# Patient Record
Sex: MALE | Race: WHITE | Employment: FULL TIME | URBAN - METROPOLITAN AREA
[De-identification: names, ages, dates, MRNs, and addresses within clinical notes are randomized per-mention and may not be internally consistent; named-entity substitution may affect disease eponyms.]

---

## 2017-06-09 ENCOUNTER — ALLSCRIPTS OFFICE VISIT (OUTPATIENT)
Dept: OTHER | Facility: OTHER | Age: 47
End: 2017-06-09

## 2017-06-09 DIAGNOSIS — Z00.00 ENCOUNTER FOR GENERAL ADULT MEDICAL EXAMINATION WITHOUT ABNORMAL FINDINGS: ICD-10-CM

## 2017-06-09 DIAGNOSIS — M79.89 OTHER DISORDERS OF SOFT TISSUE: ICD-10-CM

## 2017-06-09 DIAGNOSIS — M10.9 GOUT: ICD-10-CM

## 2017-06-09 DIAGNOSIS — M19.90 OSTEOARTHRITIS: ICD-10-CM

## 2017-06-09 DIAGNOSIS — R34 ANURIA AND OLIGURIA: ICD-10-CM

## 2017-06-09 DIAGNOSIS — R63.4 ABNORMAL WEIGHT LOSS: ICD-10-CM

## 2017-06-09 DIAGNOSIS — M77.01 MEDIAL EPICONDYLITIS OF RIGHT ELBOW: ICD-10-CM

## 2017-06-09 DIAGNOSIS — M17.11 PRIMARY OSTEOARTHRITIS OF RIGHT KNEE: ICD-10-CM

## 2017-06-14 ENCOUNTER — LAB CONVERSION - ENCOUNTER (OUTPATIENT)
Dept: OTHER | Facility: OTHER | Age: 47
End: 2017-06-14

## 2017-06-14 LAB
A/G RATIO (HISTORICAL): 1.8 (CALC) (ref 1–2.5)
ALBUMIN SERPL BCP-MCNC: 4.4 G/DL (ref 3.6–5.1)
ALP SERPL-CCNC: 70 U/L (ref 40–115)
ALT SERPL W P-5'-P-CCNC: 22 U/L (ref 9–46)
ANTI-NUCLEAR ANTIBODY (ANA) (HISTORICAL): NEGATIVE
AST SERPL W P-5'-P-CCNC: 26 U/L (ref 10–40)
BACTERIA UR QL AUTO: NORMAL /HPF
BASOPHILS # BLD AUTO: 0.9 %
BASOPHILS # BLD AUTO: 46 CELLS/UL (ref 0–200)
BILIRUB SERPL-MCNC: 0.8 MG/DL (ref 0.2–1.2)
BILIRUB UR QL STRIP: NEGATIVE
BUN SERPL-MCNC: 11 MG/DL (ref 7–25)
BUN/CREA RATIO (HISTORICAL): ABNORMAL (CALC) (ref 6–22)
CALCIUM SERPL-MCNC: 9.5 MG/DL (ref 8.6–10.3)
CHLORIDE SERPL-SCNC: 104 MMOL/L (ref 98–110)
CHOLEST SERPL-MCNC: 150 MG/DL (ref 125–200)
CHOLEST/HDLC SERPL: 3.1 (CALC)
CK SERPL-CCNC: 329 U/L (ref 44–196)
CO2 SERPL-SCNC: 31 MMOL/L (ref 20–31)
COLOR UR: YELLOW
COMMENT (HISTORICAL): CLEAR
CREAT SERPL-MCNC: 1.02 MG/DL (ref 0.6–1.35)
CULTURE RESULT (HISTORICAL): NORMAL
DEPRECATED RDW RBC AUTO: 12.7 % (ref 11–15)
EGFR AFRICAN AMERICAN (HISTORICAL): 100 ML/MIN/1.73M2
EGFR-AMERICAN CALC (HISTORICAL): 87 ML/MIN/1.73M2
EOSINOPHIL # BLD AUTO: 1.4 %
EOSINOPHIL # BLD AUTO: 71 CELLS/UL (ref 15–500)
ERYTHROCYTE SEDIMENTATION RATE (HISTORICAL): 2 MM/H
FECAL OCCULT BLOOD DIAGNOSTIC (HISTORICAL): NEGATIVE
GAMMA GLOBULIN (HISTORICAL): 2.4 G/DL (CALC) (ref 1.9–3.7)
GLUCOSE (HISTORICAL): 104 MG/DL (ref 65–99)
GLUCOSE (HISTORICAL): NEGATIVE
HCT VFR BLD AUTO: 41.9 % (ref 38.5–50)
HDLC SERPL-MCNC: 48 MG/DL
HGB BLD-MCNC: 14.2 G/DL (ref 13.2–17.1)
HYALINE CASTS #/AREA URNS LPF: NORMAL /LPF
KETONES UR STRIP-MCNC: NEGATIVE MG/DL
LDL CHOLESTEROL (HISTORICAL): 86 MG/DL (CALC)
LEUKOCYTE ESTERASE UR QL STRIP: NEGATIVE
LYME IGG/IGM AB (HISTORICAL): <0.9 INDEX
LYMPHOCYTES # BLD AUTO: 1193 CELLS/UL (ref 850–3900)
LYMPHOCYTES # BLD AUTO: 23.4 %
MCH RBC QN AUTO: 29.6 PG (ref 27–33)
MCHC RBC AUTO-ENTMCNC: 33.8 G/DL (ref 32–36)
MCV RBC AUTO: 87.7 FL (ref 80–100)
MONOCYTES # BLD AUTO: 403 CELLS/UL (ref 200–950)
MONOCYTES (HISTORICAL): 7.9 %
NEUTROPHILS # BLD AUTO: 3386 CELLS/UL (ref 1500–7800)
NEUTROPHILS # BLD AUTO: 66.4 %
NITRITE UR QL STRIP: NEGATIVE
NON-HDL-CHOL (CHOL-HDL) (HISTORICAL): 102 MG/DL (CALC)
PH UR STRIP.AUTO: 7.5 [PH] (ref 5–8)
PLATELET # BLD AUTO: 236 THOUSAND/UL (ref 140–400)
PMV BLD AUTO: 8.4 FL (ref 7.5–12.5)
POTASSIUM SERPL-SCNC: 4.6 MMOL/L (ref 3.5–5.3)
PROSTATE SPECIFIC ANTIGEN TOTAL (HISTORICAL): 1.2 NG/ML
PROT UR STRIP-MCNC: NEGATIVE MG/DL
RBC # BLD AUTO: 4.78 MILLION/UL (ref 4.2–5.8)
RBC (HISTORICAL): NORMAL /HPF
RHEUMATOID FACTOR (HISTORICAL): 9 IU/ML
SODIUM SERPL-SCNC: 140 MMOL/L (ref 135–146)
SP GR UR STRIP.AUTO: 1.01 (ref 1–1.03)
SQUAMOUS EPITHELIAL CELLS (HISTORICAL): NORMAL /HPF
TOTAL PROTEIN (HISTORICAL): 6.8 G/DL (ref 6.1–8.1)
TRIGL SERPL-MCNC: 82 MG/DL
TSH SERPL DL<=0.05 MIU/L-ACNC: 1.03 MIU/L (ref 0.4–4.5)
URIC ACID (HISTORICAL): 6.2 MG/DL (ref 4–8)
WBC # BLD AUTO: 5.1 THOUSAND/UL (ref 3.8–10.8)
WBC # BLD AUTO: NORMAL /HPF

## 2017-06-16 ENCOUNTER — GENERIC CONVERSION - ENCOUNTER (OUTPATIENT)
Dept: OTHER | Facility: OTHER | Age: 47
End: 2017-06-16

## 2018-01-13 NOTE — RESULT NOTES
Verified Results  (Q) LIPID PANEL WITH REFLEX TO DIRECT LDL [id number] 09:46AM Nate Sarna     Test Name Result Flag Reference   CHOLESTEROL, TOTAL 150 mg/dL  125-200   HDL CHOLESTEROL 48 mg/dL  > OR = 40   TRIGLICERIDES 82 mg/dL  <980   LDL-CHOLESTEROL 86 mg/dL (calc)  <130   Desirable range <100 mg/dL for patients with CHD or  diabetes and <70 mg/dL for diabetic patients with  known heart disease  CHOL/HDLC RATIO 3 1 (calc)  < OR = 5 0   NON HDL CHOLESTEROL 102 mg/dL (calc)     Target for non-HDL cholesterol is 30 mg/dL higher than   LDL cholesterol target  (1) URIC ACID [id number] 09:46AM Nate Sarna     Test Name Result Flag Reference   URIC ACID 6 2 mg/dL  4 0-8 0   Therapeutic target for gout patients: <6 0 mg/dL     (1) COMPREHENSIVE METABOLIC PANEL [id number] 55:80NI Nate Sarna     Test Name Result Flag Reference   GLUCOSE 104 mg/dL H 65-99   Fasting reference interval     For someone without known diabetes, a glucose value  between 100 and 125 mg/dL is consistent with  prediabetes and should be confirmed with a  follow-up test    UREA NITROGEN (BUN) 11 mg/dL  7-25   CREATININE 1 02 mg/dL  0 60-1 35   eGFR NON-AFR   AMERICAN 87 mL/min/1 73m2  > OR = 60   eGFR AFRICAN AMERICAN 100 mL/min/1 73m2  > OR = 60   BUN/CREATININE RATIO   8-80   NOT APPLICABLE (calc)   SODIUM 140 mmol/L  135-146   POTASSIUM 4 6 mmol/L  3 5-5 3   CHLORIDE 104 mmol/L     CARBON DIOXIDE 31 mmol/L  20-31   CALCIUM 9 5 mg/dL  8 6-10 3   PROTEIN, TOTAL 6 8 g/dL  6 1-8 1   ALBUMIN 4 4 g/dL  3 6-5 1   GLOBULIN 2 4 g/dL (calc)  1 9-3 7   ALBUMIN/GLOBULIN RATIO 1 8 (calc)  1 0-2 5   BILIRUBIN, TOTAL 0 8 mg/dL  0 2-1 2   ALKALINE PHOSPHATASE 70 U/L     AST 26 U/L  10-40   ALT 22 U/L  9-46     (1) CK (CPK) [id number] 09:46AM Nate Sarna     Test Name Result Flag Reference   CREATINE KINASE, TOTAL 329 U/L H      (Q) SED RATE BY MODIFIED Nilam Ashley [id number] 09:46AM Nate Serrano     Test Name Result Flag Reference   SED RATE BY MODIFIED$[facility] 2 mm/h  < OR = 15     (1) URINALYSIS w URINE C/S REFLEX (will reflex a microscopy if leukocytes, occult blood, or nitrites are not within normal limits) [id number] 09:46AM Ashland Ing     Test Name Result Flag Reference   SPECIFIC GRAVITY 1 012  1 001-1 035   BILIRUBIN NEGATIVE  NEGATIVE   GLUCOSE NEGATIVE  NEGATIVE   COLOR YELLOW  YELLOW   APPEARANCE CLEAR  CLEAR   PH 7 5  5 0-8 0   KETONES NEGATIVE  NEGATIVE   OCCULT BLOOD NEGATIVE  NEGATIVE   PROTEIN NEGATIVE  NEGATIVE   SQUAMOUS EPITHELIAL CELLS NONE SEEN /HPF  < OR = 5   HYALINE CAST NONE SEEN /LPF  NONE SEEN   REFLEXIVE URINE CULTURE NO CULTURE INDICATED     RBC NONE SEEN /HPF  < OR = 2   NITRITE NEGATIVE  NEGATIVE   LEUKOCYTE ESTERASE NEGATIVE  NEGATIVE   WBC NONE SEEN /HPF  < OR = 5   BACTERIA NONE SEEN /HPF  NONE SEEN     (1) CBC/PLT/DIFF [id number] 09:46AM Ashland Ing     Test Name Result Flag Reference   WHITE BLOOD CELL COUNT 5 1 Thousand/uL  3 8-10 8   RED BLOOD CELL COUNT 4 78 Million/uL  4 20-5 80   HEMOGLOBIN 14 2 g/dL  13 2-17 1   HEMATOCRIT 41 9 %  38 5-50 0   MCV 87 7 fL  80 0-100 0   MCH 29 6 pg  27 0-33 0   MCHC 33 8 g/dL  32 0-36 0   RDW 12 7 %  11 0-15 0   PLATELET COUNT 659 Thousand/uL  140-400   ABSOLUTE NEUTROPHILS 3386 cells/uL  2585-0991   ABSOLUTE LYMPHOCYTES 1193 cells/uL  850-3900   ABSOLUTE MONOCYTES 403 cells/uL  200-950   ABSOLUTE EOSINOPHILS 71 cells/uL     ABSOLUTE BASOPHILS 46 cells/uL  0-200   NEUTROPHILS 66 4 %     LYMPHOCYTES 23 4 %     MONOCYTES 7 9 %     EOSINOPHILS 1 4 %     BASOPHILS 0 9 %     MPV 8 4 fL  7 5-12 5     (Q) LYME DISEASE AB, TOTAL W/REFL WB (IGG, IGM) [id number] 09:46AM Ashland Ing     Test Name Result Flag Reference   LYME AB SCREEN <0 90 index     Index                Interpretation                     -----                --------------                     < 0 90               Negative                     0  90-1 09            Equivocal > 1 09               Positive      As recommended by the Food and Drug Administration   (FDA), all samples with positive or equivocal   results in a Borrelia burgdorferi antibody screen  will be tested using a blot method  Positive or   equivocal screening test results should not be   interpreted as truly positive until verified as such   using a supplemental assay (e g , B  burgdorferi blot)  The screening test and/or blot for B  burgdorferi   antibodies may be falsely negative in early stages  of Lyme disease, including the period when erythema   migrans is apparent  (Q) DAPHNE SCREEN, IFA, WITH REFLEX TO TITER AND PATTERN [id number] 09:46AM Genetta Guard     Test Name Result Flag Reference   DAPHNE SCREEN, IFA NEGATIVE  NEGATIVE   DAPHNE IFA is a first line screen for detecting the  presence of up to approximately 150 autoantibodies in  various autoimmune diseases  A negative DAPHNE IFA result  suggests DAPHNE-associated autoimmune diseases are not  present at this time  Visit Physician FAQs for interpretation of all  antibodies in the [place], prevalence, and association  with diseases at http://Talkito/  faq/GJW949     (1) RF QUANTITATION [id number] 09:46AM Genetta Guard     Test Name Result Flag Reference   RHEUMATOID FACTOR 9 IU/mL  <14     (Q) TSH, 3RD GENERATION [id number] 09:46AM Estechta Guard     Test Name Result Flag Reference   TSH 1 03 mIU/L  0 40-4 50     (1) PSA (SCREEN) (Dx V76 44 Screen for Prostate Cancer) [id number] 09:46AM Unype   REPORT COMMENT:  FASTING:YES     Test Name Result Flag Reference   PSA, TOTAL 1 2 ng/mL  < OR = 4 0   The total PSA value from this assay system is   standardized against the WHO standard  The test   result will be approximately 20% lower when compared   to the equimolar-standardized total PSA (Tata   Dalton)  Comparison of serial PSA results should be   interpreted with this fact in mind       This test was performed using the Siemens   chemiluminescent method  Values obtained from   different assay methods cannot be used  interchangeably  PSA levels, regardless of  value, should not be interpreted as absolute  evidence of the presence or absence of disease

## 2018-01-14 VITALS
SYSTOLIC BLOOD PRESSURE: 124 MMHG | RESPIRATION RATE: 16 BRPM | HEART RATE: 56 BPM | WEIGHT: 220.5 LBS | HEIGHT: 73 IN | DIASTOLIC BLOOD PRESSURE: 80 MMHG | BODY MASS INDEX: 29.22 KG/M2

## 2018-07-10 ENCOUNTER — APPOINTMENT (EMERGENCY)
Dept: CT IMAGING | Facility: HOSPITAL | Age: 48
DRG: 683 | End: 2018-07-10
Payer: COMMERCIAL

## 2018-07-10 ENCOUNTER — HOSPITAL ENCOUNTER (INPATIENT)
Facility: HOSPITAL | Age: 48
LOS: 2 days | Discharge: HOME | DRG: 683 | End: 2018-07-12
Attending: EMERGENCY MEDICINE | Admitting: FAMILY MEDICINE
Payer: COMMERCIAL

## 2018-07-10 DIAGNOSIS — N17.9 AKI (ACUTE KIDNEY INJURY) (HCC): ICD-10-CM

## 2018-07-10 DIAGNOSIS — E87.5 HYPERKALEMIA: Primary | ICD-10-CM

## 2018-07-10 LAB
ALBUMIN SERPL BCP-MCNC: 5.9 G/DL (ref 3.5–5)
ALP SERPL-CCNC: 95 U/L (ref 46–116)
ALT SERPL W P-5'-P-CCNC: 52 U/L (ref 12–78)
ANION GAP SERPL CALCULATED.3IONS-SCNC: 12 MMOL/L (ref 4–13)
ANION GAP SERPL CALCULATED.3IONS-SCNC: 16 MMOL/L (ref 4–13)
AST SERPL W P-5'-P-CCNC: 74 U/L (ref 5–45)
BACTERIA UR QL AUTO: NORMAL /HPF
BASOPHILS # BLD AUTO: 0.07 THOUSANDS/ΜL (ref 0–0.1)
BASOPHILS NFR BLD AUTO: 1 % (ref 0–1)
BILIRUB SERPL-MCNC: 2.3 MG/DL (ref 0.2–1)
BILIRUB UR QL STRIP: NEGATIVE
BUN SERPL-MCNC: 30 MG/DL (ref 5–25)
BUN SERPL-MCNC: 31 MG/DL (ref 5–25)
CALCIUM SERPL-MCNC: 10.7 MG/DL (ref 8.3–10.1)
CALCIUM SERPL-MCNC: 8.3 MG/DL (ref 8.3–10.1)
CHLORIDE SERPL-SCNC: 104 MMOL/L (ref 100–108)
CHLORIDE SERPL-SCNC: 93 MMOL/L (ref 100–108)
CK MB SERPL-MCNC: 16 NG/ML (ref 0–5)
CK MB SERPL-MCNC: <1 % (ref 0–2.5)
CK SERPL-CCNC: 2338 U/L (ref 39–308)
CLARITY UR: CLEAR
CO2 SERPL-SCNC: 22 MMOL/L (ref 21–32)
CO2 SERPL-SCNC: 24 MMOL/L (ref 21–32)
COLOR UR: YELLOW
CREAT SERPL-MCNC: 2.17 MG/DL (ref 0.6–1.3)
CREAT SERPL-MCNC: 2.93 MG/DL (ref 0.6–1.3)
EOSINOPHIL # BLD AUTO: 0.01 THOUSAND/ΜL (ref 0–0.61)
EOSINOPHIL NFR BLD AUTO: 0 % (ref 0–6)
ERYTHROCYTE [DISTWIDTH] IN BLOOD BY AUTOMATED COUNT: 11.8 % (ref 11.6–15.1)
GFR SERPL CREATININE-BSD FRML MDRD: 24 ML/MIN/1.73SQ M
GFR SERPL CREATININE-BSD FRML MDRD: 34 ML/MIN/1.73SQ M
GLUCOSE SERPL-MCNC: 50 MG/DL (ref 65–140)
GLUCOSE SERPL-MCNC: 97 MG/DL (ref 65–140)
GLUCOSE UR STRIP-MCNC: NEGATIVE MG/DL
HCT VFR BLD AUTO: 48.7 % (ref 36.5–49.3)
HGB BLD-MCNC: 17.3 G/DL (ref 12–17)
HGB UR QL STRIP.AUTO: ABNORMAL
IMM GRANULOCYTES # BLD AUTO: 0.06 THOUSAND/UL (ref 0–0.2)
IMM GRANULOCYTES NFR BLD AUTO: 1 % (ref 0–2)
KETONES UR STRIP-MCNC: NEGATIVE MG/DL
LEUKOCYTE ESTERASE UR QL STRIP: NEGATIVE
LYMPHOCYTES # BLD AUTO: 1.56 THOUSANDS/ΜL (ref 0.6–4.47)
LYMPHOCYTES NFR BLD AUTO: 12 % (ref 14–44)
MAGNESIUM SERPL-MCNC: 2.4 MG/DL (ref 1.6–2.6)
MCH RBC QN AUTO: 29.9 PG (ref 26.8–34.3)
MCHC RBC AUTO-ENTMCNC: 35.5 G/DL (ref 31.4–37.4)
MCV RBC AUTO: 84 FL (ref 82–98)
MONOCYTES # BLD AUTO: 1.31 THOUSAND/ΜL (ref 0.17–1.22)
MONOCYTES NFR BLD AUTO: 10 % (ref 4–12)
NEUTROPHILS # BLD AUTO: 10.23 THOUSANDS/ΜL (ref 1.85–7.62)
NEUTS SEG NFR BLD AUTO: 76 % (ref 43–75)
NITRITE UR QL STRIP: NEGATIVE
NON-SQ EPI CELLS URNS QL MICRO: NORMAL /HPF
NRBC BLD AUTO-RTO: 0 /100 WBCS
PH UR STRIP.AUTO: 6 [PH] (ref 4.5–8)
PLATELET # BLD AUTO: 392 THOUSANDS/UL (ref 149–390)
PMV BLD AUTO: 9.1 FL (ref 8.9–12.7)
POTASSIUM SERPL-SCNC: 4.1 MMOL/L (ref 3.5–5.3)
POTASSIUM SERPL-SCNC: 6.9 MMOL/L (ref 3.5–5.3)
PROT SERPL-MCNC: 9.7 G/DL (ref 6.4–8.2)
PROT UR STRIP-MCNC: NEGATIVE MG/DL
RBC # BLD AUTO: 5.78 MILLION/UL (ref 3.88–5.62)
RBC #/AREA URNS AUTO: NORMAL /HPF
SODIUM SERPL-SCNC: 133 MMOL/L (ref 136–145)
SODIUM SERPL-SCNC: 138 MMOL/L (ref 136–145)
SP GR UR STRIP.AUTO: 1.01 (ref 1–1.03)
TROPONIN I SERPL-MCNC: <0.02 NG/ML
UROBILINOGEN UR QL STRIP.AUTO: 0.2 E.U./DL
WBC # BLD AUTO: 13.24 THOUSAND/UL (ref 4.31–10.16)
WBC #/AREA URNS AUTO: NORMAL /HPF

## 2018-07-10 PROCEDURE — 81001 URINALYSIS AUTO W/SCOPE: CPT | Performed by: FAMILY MEDICINE

## 2018-07-10 PROCEDURE — 80053 COMPREHEN METABOLIC PANEL: CPT | Performed by: EMERGENCY MEDICINE

## 2018-07-10 PROCEDURE — 93005 ELECTROCARDIOGRAM TRACING: CPT

## 2018-07-10 PROCEDURE — 80048 BASIC METABOLIC PNL TOTAL CA: CPT | Performed by: EMERGENCY MEDICINE

## 2018-07-10 PROCEDURE — 70450 CT HEAD/BRAIN W/O DYE: CPT

## 2018-07-10 PROCEDURE — 83735 ASSAY OF MAGNESIUM: CPT | Performed by: EMERGENCY MEDICINE

## 2018-07-10 PROCEDURE — 96375 TX/PRO/DX INJ NEW DRUG ADDON: CPT

## 2018-07-10 PROCEDURE — 82550 ASSAY OF CK (CPK): CPT | Performed by: EMERGENCY MEDICINE

## 2018-07-10 PROCEDURE — 84484 ASSAY OF TROPONIN QUANT: CPT | Performed by: EMERGENCY MEDICINE

## 2018-07-10 PROCEDURE — 96365 THER/PROPH/DIAG IV INF INIT: CPT

## 2018-07-10 PROCEDURE — 99223 1ST HOSP IP/OBS HIGH 75: CPT | Performed by: FAMILY MEDICINE

## 2018-07-10 PROCEDURE — 82553 CREATINE MB FRACTION: CPT | Performed by: EMERGENCY MEDICINE

## 2018-07-10 PROCEDURE — 96374 THER/PROPH/DIAG INJ IV PUSH: CPT

## 2018-07-10 PROCEDURE — 36415 COLL VENOUS BLD VENIPUNCTURE: CPT | Performed by: EMERGENCY MEDICINE

## 2018-07-10 PROCEDURE — 99285 EMERGENCY DEPT VISIT HI MDM: CPT

## 2018-07-10 PROCEDURE — 85025 COMPLETE CBC W/AUTO DIFF WBC: CPT | Performed by: EMERGENCY MEDICINE

## 2018-07-10 RX ORDER — DIAZEPAM 5 MG/ML
5 INJECTION, SOLUTION INTRAMUSCULAR; INTRAVENOUS ONCE
Status: COMPLETED | OUTPATIENT
Start: 2018-07-10 | End: 2018-07-10

## 2018-07-10 RX ORDER — SODIUM CHLORIDE 9 MG/ML
125 INJECTION, SOLUTION INTRAVENOUS CONTINUOUS
Status: DISCONTINUED | OUTPATIENT
Start: 2018-07-10 | End: 2018-07-11

## 2018-07-10 RX ORDER — HEPARIN SODIUM 5000 [USP'U]/ML
5000 INJECTION, SOLUTION INTRAVENOUS; SUBCUTANEOUS EVERY 8 HOURS SCHEDULED
Status: DISCONTINUED | OUTPATIENT
Start: 2018-07-10 | End: 2018-07-12 | Stop reason: HOSPADM

## 2018-07-10 RX ORDER — ACETAMINOPHEN 325 MG/1
650 TABLET ORAL EVERY 6 HOURS PRN
Status: DISCONTINUED | OUTPATIENT
Start: 2018-07-10 | End: 2018-07-11

## 2018-07-10 RX ORDER — SODIUM POLYSTYRENE SULFONATE 15 G/60ML
30 SUSPENSION ORAL; RECTAL ONCE
Status: COMPLETED | OUTPATIENT
Start: 2018-07-10 | End: 2018-07-10

## 2018-07-10 RX ORDER — NICOTINE 21 MG/24HR
1 PATCH, TRANSDERMAL 24 HOURS TRANSDERMAL DAILY
Status: DISCONTINUED | OUTPATIENT
Start: 2018-07-11 | End: 2018-07-12 | Stop reason: HOSPADM

## 2018-07-10 RX ORDER — DEXTROSE MONOHYDRATE 25 G/50ML
25 INJECTION, SOLUTION INTRAVENOUS ONCE
Status: COMPLETED | OUTPATIENT
Start: 2018-07-10 | End: 2018-07-10

## 2018-07-10 RX ADMIN — DEXTROSE MONOHYDRATE 25 G: 25 INJECTION, SOLUTION INTRAVENOUS at 15:40

## 2018-07-10 RX ADMIN — SODIUM CHLORIDE 2000 ML: 0.9 INJECTION, SOLUTION INTRAVENOUS at 15:53

## 2018-07-10 RX ADMIN — Medication 1 G: at 15:53

## 2018-07-10 RX ADMIN — Medication 5 MG: at 15:15

## 2018-07-10 RX ADMIN — INSULIN HUMAN 10 UNITS: 100 INJECTION, SOLUTION PARENTERAL at 15:41

## 2018-07-10 RX ADMIN — HEPARIN SODIUM 5000 UNITS: 5000 INJECTION, SOLUTION INTRAVENOUS; SUBCUTANEOUS at 21:36

## 2018-07-10 RX ADMIN — SODIUM CHLORIDE 2000 ML: 0.9 INJECTION, SOLUTION INTRAVENOUS at 16:49

## 2018-07-10 RX ADMIN — SODIUM POLYSTYRENE SULFONATE 30 G: 15 SUSPENSION ORAL; RECTAL at 15:48

## 2018-07-10 NOTE — H&P
H&P- Tara Heard 1970, 46 y o  male MRN: [id number]    Unit/Bed#: ED 28 Encounter: [id number]    Primary Care Provider: Percy Escobar MD   Date and time admitted to hospital: 7/10/2018  2:38 PM        Acute renal failure (ARF) [facility])   Assessment & Plan    Secondary to dehydration  Encourage hydration  If the patient does not improve tomorrow will do some further imaging such as a renal ultrasound and have Nephrology evaluate the patient  I do not feel it is necessary at this time  Will check a urinalysis if not sent  Patient also has evidence of rhabdomyolysis  Check CPK level in the morning  Acute hyperkalemia   Assessment & Plan    Hyperkalemia: This is likely secondary to severe dehydration  The patient did have some EKG changes and was treated here in the emergency department  Repeat potassium level is 4 1  Continue to monitor on telemetry and check another level in the morning  VTE Prophylaxis: Heparin  / sequential compression device   Code Status:  Full code  POLST: POLST is not applicable to this patient  Discussion with family:  Wife at the bedside    Anticipated Length of Stay:  Patient will be admitted on an Inpatient basis with an anticipated length of stay of  greater than 2 midnights  Justification for Hospital Stay:  Greater than 2 midnights secondary to having acute renal failure and severe hypokalemia with EKG changes needing aggressive hydration    Total Time for Visit, including Counseling / Coordination of Care: 1 hour  Greater than 50% of this total time spent on direct patient counseling and coordination of care  Chief Complaint:   Generalized pain the and weakness    History of Present Illness:    Tara Heard is a 46 y o  male who presents with generalized cramping and weakness  Patient states since yesterday he has not been feeling well  He is a  and is outside a lot    He states that he does drink several bottles of water a day but yesterday he just started cramping  He just felt like he was having a heart attack as well but no such chest pain but just generalized aches and pains  He felt very nauseous and dizzy as well  The patient states he gets this way every now and then in in the past he has been told that he gets severely dehydrated  He is not on any current medications and has had no change to his diet  He is feeling significantly better since he has been here in the emergency department       Review of Systems:    Review of Systems   Constitutional: Positive for appetite change and fatigue  Respiratory: Positive for chest tightness  Musculoskeletal:        Severe muscle cramps   Neurological: Positive for dizziness and light-headedness  All other systems reviewed and are negative  Past Medical and Surgical History:     Past Medical History:   Diagnosis Date    Asthma     Gout        Past Surgical History:   Procedure Laterality Date    FINGER AMPUTATION         Meds/Allergies:    Prior to Admission medications    Not on File     I have reviewed home medications with patient personally      Allergies: No Known Allergies    Social History:     Marital Status: /Civil Union   Occupation:  Construction  Patient Pre-hospital Living Situation:  Lives with his wife  Patient Pre-hospital Level of Mobility:  Independent  Patient Pre-hospital Diet Restrictions:  None  Substance Use History:   History   Alcohol Use    Yes     Comment: on rare occas     History   Smoking Status    Current Every Day Smoker    Packs/day: 1 00    Types: Cigarettes   Smokeless Tobacco    Never Used     History   Drug Use No       Family History:    Family History   Problem Relation Age of Onset    No Known Problems Mother     No Known Problems Father        Physical Exam:     Vitals:   Blood Pressure: 158/77 (07/10/18 1601)  Pulse: 75 (07/10/18 1601)  Temperature: 98 3 °F (36 8 °C) (07/10/18 1445)  Temp Source: Oral (07/10/18 1445)  Respirations: 18 (07/10/18 1601)  Weight - Scale: 115 kg (253 lb 8 5 oz) (07/10/18 1446)  SpO2: 100 % (07/10/18 1601)    Physical Exam    (   General Appearance:    Alert, cooperative, no distress, appears stated age   Head:    Normocephalic, without obvious abnormality, atraumatic   Eyes:    PERRL, conjunctiva/corneas clear, EOM's intact,             Nose:   Nares normal, septum midline, mucosa normal   Throat:   Lips, mucosa, and tongue normal; teeth and gums normal   Neck:   Supple, symmetrical, no adenopathy;        thyroid:  No enlargement/tenderness/nodules; no carotid    bruit or JVD   Back:     Symmetric, no curvature, ROM normal, no CVA tenderness   Lungs:     Clear to auscultation bilaterally, respirations unlabored       Heart:    Regular rate and rhythm, S1 and S2 normal, no murmur, rub    or gallop   Abdomen:     Soft, non-tender, bowel sounds active all four quadrants,     no masses, no organomegaly           Extremities:   Extremities normal, atraumatic, no cyanosis or edema   Pulses:   2+ and symmetric all extremities   Skin:   Skin color, texture, turgor normal, no rashes or lesions   Lymph nodes:   Cervical, supraclavicular, and axillary nodes normal   Neurologic:   CNII-XII intact  Normal strength, sensation and reflexes       throughout   exam)    Additional Data:     Lab Results: I have personally reviewed pertinent reports          Results from last 7 days  Lab Units 07/10/18  1457   WBC Thousand/uL 13 24*   HEMOGLOBIN g/dL 17 3*   HEMATOCRIT % 48 7   PLATELETS Thousands/uL 392*   NEUTROS PCT % 76*   LYMPHS PCT % 12*   MONOS PCT % 10   EOS PCT % 0       Results from last 7 days  Lab Units 07/10/18  1618 07/10/18  1456   SODIUM mmol/L 138 133*   POTASSIUM mmol/L 4 1 6 9*   CHLORIDE mmol/L 104 93*   CO2 mmol/L 22 24   BUN mg/dL 30* 31*   CREATININE mg/dL 2 17* 2 93*   CALCIUM mg/dL 8 3 10 7*   TOTAL PROTEIN g/dL  --  9 7*   BILIRUBIN TOTAL mg/dL  --  2 30*   ALK PHOS U/L  --  95   ALT U/L --  52   AST U/L  --  74*   GLUCOSE RANDOM mg/dL 50* 97                   Imaging: I have personally reviewed pertinent reports  CT head without contrast   Final Result by Lucy Jackson MD (07/10 1511)      No acute intracranial abnormality  Workstation performed: [id number]             ·     [facility] Review[facility] Yes     ** Please Note: This note has been constructed using a voice recognition system   **

## 2018-07-10 NOTE — LETTER
[address] FLO[address] MED SURG UNIT  [facility] [address]  Dept: [phone]    July 12, 2018     Patient: Tara Heard   YOB: 1970   Date of Visit: 7/10/2018       To Whom it May Concern:    Emeterio Fitzpatrick is under my professional care  He was seen in the hospital from 7/10/2018   to 07/12/18  He Should remain out of work until evaluated by his pcp on 7/19/2018    If you have any questions or concerns, please don't hesitate to call           Sincerely,          Briseida Cordova

## 2018-07-10 NOTE — ASSESSMENT & PLAN NOTE
Secondary to dehydration  Encourage hydration  If the patient does not improve tomorrow will do some further imaging such as a renal ultrasound and have Nephrology evaluate the patient  I do not feel it is necessary at this time    Will check a urinalysis if not sent

## 2018-07-10 NOTE — ASSESSMENT & PLAN NOTE
Hyperkalemia: This is likely secondary to severe dehydration  The patient did have some EKG changes and was treated here in the emergency department  Repeat potassium level is 4 1  Continue to monitor on telemetry and check another level in the morning

## 2018-07-10 NOTE — ED PROVIDER NOTES
History  Chief Complaint   Patient presents with    Heat Exposure     Patient stated that 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 and disoriented  Patient drove home however doesnt know how he got there  68-year-old male patient presents to the emergency department, on his birthday, for evaluation of dizziness  Patient states he was at work today, [employer], when he became too dizzy to stand  The patient got down from the roof and stated his dizziness could worsened  The patient had EKG done upon arrival here at 1445 hr, reviewed and interpreted by me at bedside showing a sinus rhythm with peaked T-waves in the ventricular leads  Ventricular rate is 69 with no other ectopy  The patient does have a positive modified Mc-Hallpike with looking to the right  The patient has no other physical exam abnormalities other than he does have dry mucous membranes  Pre-hospital IVs been established in the patient is getting some IV fluids, a larger IV will be established and fluids will be delivered more quickly  Labs will be done, CT scan of the head will be done, to evaluate the patient with a differential diagnosis to include but not be limited to dehydration, vertigo, heat exhaustion, metabolic abnormality from dehydration          History provided by:  Patient   used: No    Dizziness   Quality:  Lightheadedness, head spinning, imbalance and room spinning  Severity:  Moderate  Onset quality:  Gradual  Timing:  Constant  Progression:  Worsening  Chronicity:  New  Context: bending over, head movement, physical activity and standing up    Context: not with bowel movement and not with loss of consciousness    Relieved by:  Nothing  Worsened by:  Nothing  Ineffective treatments:  None tried  Associated symptoms: no blood in stool, no hearing loss, no shortness of breath, no vision changes and no vomiting    Risk factors: no anemia        None       Past Medical History:   Diagnosis Date    Asthma     Gout        Past Surgical History:   Procedure Laterality Date    FINGER AMPUTATION         No family history on file  I have reviewed and agree with the history as documented  Social History   Substance Use Topics    Smoking status: Current Every Day Smoker     Packs/day: 1 00     Types: Cigarettes    Smokeless tobacco: Never Used    Alcohol use Yes        Review of Systems   HENT: Negative for hearing loss  Respiratory: Negative for shortness of breath  Gastrointestinal: Negative for blood in stool and vomiting  Neurological: Positive for dizziness  All other systems reviewed and are negative  Physical Exam  Physical Exam   Constitutional: He is oriented to person, place, and time  He appears well-developed and well-nourished  No distress  HENT:   Head: Normocephalic and atraumatic  Right Ear: External ear normal    Left Ear: External ear normal    Eyes: Conjunctivae and EOM are normal  Right eye exhibits no discharge  Left eye exhibits no discharge  No scleral icterus  Neck: Normal range of motion  Neck supple  No JVD present  No tracheal deviation present  No thyromegaly present  Cardiovascular: Normal rate and regular rhythm  Pulmonary/Chest: Effort normal and breath sounds normal  No stridor  No respiratory distress  He has no wheezes  He has no rales  Abdominal: Soft  Bowel sounds are normal  He exhibits no distension  There is no tenderness  Musculoskeletal: Normal range of motion  He exhibits no edema, tenderness or deformity  Neurological: He is alert and oriented to person, place, and time  No cranial nerve deficit  Coordination normal    Skin: Skin is warm and dry  He is not diaphoretic  Psychiatric: He has a normal mood and affect  His behavior is normal    Nursing note and vitals reviewed        Vital Signs  ED Triage Vitals [07/10/18 1445]   Temp Pulse Respirations BP SpO2   -- 66 17 -- 98 %      Temp src Heart Rate Source Patient Position - Orthostatic VS BP Location FiO2 (%)   -- Monitor Lying Right arm --      Pain Score       --           Vitals:    07/10/18 1445   Pulse: 66   Patient Position - Orthostatic VS: Lying       Visual Acuity      ED Medications  Medications   diazepam (VALIUM) injection 5 mg (not administered)       Diagnostic Studies  Results Reviewed     Procedure Component Value Units Date/Time    CBC and differential [[id number]]     Lab Status:  No result Specimen:  Blood     Comprehensive metabolic panel [[id number]]     Lab Status:  No result Specimen:  Blood     Troponin I [[id number]]     Lab Status:  No result Specimen:  Blood     Magnesium [[id number]]     Lab Status:  No result Specimen:  Blood                  CT head without contrast    (Results Pending)              Procedures  Procedures       Phone Contacts  ED Phone Contact    ED Course  ED Course as of Jul 10 1603   Tue Jul 10, 2018   1544 The patient is stable  He is receiving treatment for hyperkalemia  He states this has happened in the past                                   MDM  Number of Diagnoses or Management Options  SAJAN (acute kidney injury) [facility]): new and requires workup  Hyperkalemia: new and requires workup     Amount and/or Complexity of Data Reviewed  Clinical lab tests: ordered and reviewed  Tests in the radiology section of CPT®: ordered and reviewed  Decide to obtain previous medical records or to obtain history from someone other than the patient: yes  Review and summarize past medical records: yes  Independent visualization of images, tracings, or specimens: yes    Patient Progress  Patient progress: stable    The patient presented with a condition in which there was a high probability of imminent or life-threatening deterioration, and critical care services (excluding separately billable procedures) totalled 30-74 minutes          Disposition  Final diagnoses:   None     ED Disposition     None      Follow-up Information    None         Patient's Medications    No medications on file     No discharge procedures on file      ED Provider  Electronically Signed by           Saray Liriano DO  07/10/18 1881

## 2018-07-11 ENCOUNTER — TELEPHONE (OUTPATIENT)
Dept: INTERNAL MEDICINE CLINIC | Facility: CLINIC | Age: 48
End: 2018-07-11

## 2018-07-11 PROBLEM — M62.82 RHABDOMYOLYSIS: Status: ACTIVE | Noted: 2018-07-11

## 2018-07-11 LAB
ANION GAP SERPL CALCULATED.3IONS-SCNC: 3 MMOL/L (ref 4–13)
ATRIAL RATE: 69 BPM
ATRIAL RATE: 80 BPM
ATRIAL RATE: 83 BPM
BUN SERPL-MCNC: 18 MG/DL (ref 5–25)
CALCIUM SERPL-MCNC: 7.7 MG/DL (ref 8.3–10.1)
CHLORIDE SERPL-SCNC: 108 MMOL/L (ref 100–108)
CK MB SERPL-MCNC: 5.5 NG/ML (ref 0–5)
CK MB SERPL-MCNC: 7.4 NG/ML (ref 0–5)
CK MB SERPL-MCNC: <1 % (ref 0–2.5)
CK MB SERPL-MCNC: <1 % (ref 0–2.5)
CK SERPL-CCNC: 1082 U/L (ref 39–308)
CK SERPL-CCNC: 875 U/L (ref 39–308)
CO2 SERPL-SCNC: 28 MMOL/L (ref 21–32)
CREAT SERPL-MCNC: 1.26 MG/DL (ref 0.6–1.3)
GFR SERPL CREATININE-BSD FRML MDRD: 66 ML/MIN/1.73SQ M
GLUCOSE SERPL-MCNC: 99 MG/DL (ref 65–140)
MAGNESIUM SERPL-MCNC: 2.5 MG/DL (ref 1.6–2.6)
P AXIS: 57 DEGREES
P AXIS: 63 DEGREES
P AXIS: 67 DEGREES
POTASSIUM SERPL-SCNC: 4.3 MMOL/L (ref 3.5–5.3)
PR INTERVAL: 178 MS
PR INTERVAL: 178 MS
PR INTERVAL: 182 MS
QRS AXIS: 26 DEGREES
QRS AXIS: 28 DEGREES
QRS AXIS: 48 DEGREES
QRSD INTERVAL: 86 MS
QRSD INTERVAL: 96 MS
QRSD INTERVAL: 98 MS
QT INTERVAL: 400 MS
QT INTERVAL: 410 MS
QT INTERVAL: 418 MS
QTC INTERVAL: 439 MS
QTC INTERVAL: 470 MS
QTC INTERVAL: 482 MS
SODIUM SERPL-SCNC: 139 MMOL/L (ref 136–145)
T WAVE AXIS: 43 DEGREES
T WAVE AXIS: 45 DEGREES
T WAVE AXIS: 53 DEGREES
VENTRICULAR RATE: 69 BPM
VENTRICULAR RATE: 80 BPM
VENTRICULAR RATE: 83 BPM

## 2018-07-11 PROCEDURE — 82550 ASSAY OF CK (CPK): CPT | Performed by: FAMILY MEDICINE

## 2018-07-11 PROCEDURE — 83735 ASSAY OF MAGNESIUM: CPT | Performed by: FAMILY MEDICINE

## 2018-07-11 PROCEDURE — 93010 ELECTROCARDIOGRAM REPORT: CPT | Performed by: INTERNAL MEDICINE

## 2018-07-11 PROCEDURE — 82550 ASSAY OF CK (CPK): CPT | Performed by: NURSE PRACTITIONER

## 2018-07-11 PROCEDURE — 99233 SBSQ HOSP IP/OBS HIGH 50: CPT | Performed by: NURSE PRACTITIONER

## 2018-07-11 PROCEDURE — 80048 BASIC METABOLIC PNL TOTAL CA: CPT | Performed by: FAMILY MEDICINE

## 2018-07-11 PROCEDURE — 82553 CREATINE MB FRACTION: CPT | Performed by: FAMILY MEDICINE

## 2018-07-11 PROCEDURE — 82553 CREATINE MB FRACTION: CPT | Performed by: NURSE PRACTITIONER

## 2018-07-11 RX ORDER — SODIUM CHLORIDE 9 MG/ML
125 INJECTION, SOLUTION INTRAVENOUS CONTINUOUS
Status: DISCONTINUED | OUTPATIENT
Start: 2018-07-11 | End: 2018-07-12 | Stop reason: HOSPADM

## 2018-07-11 RX ORDER — ACETAMINOPHEN 325 MG/1
650 TABLET ORAL EVERY 6 HOURS PRN
Status: DISCONTINUED | OUTPATIENT
Start: 2018-07-11 | End: 2018-07-12 | Stop reason: HOSPADM

## 2018-07-11 RX ADMIN — SODIUM CHLORIDE 125 ML/HR: 0.9 INJECTION, SOLUTION INTRAVENOUS at 02:04

## 2018-07-11 RX ADMIN — NICOTINE 1 PATCH: 21 PATCH, EXTENDED RELEASE TRANSDERMAL at 08:12

## 2018-07-11 RX ADMIN — SODIUM CHLORIDE 1000 ML: 0.9 INJECTION, SOLUTION INTRAVENOUS at 11:52

## 2018-07-11 RX ADMIN — SODIUM CHLORIDE 125 ML/HR: 0.9 INJECTION, SOLUTION INTRAVENOUS at 17:45

## 2018-07-11 RX ADMIN — HEPARIN SODIUM 5000 UNITS: 5000 INJECTION, SOLUTION INTRAVENOUS; SUBCUTANEOUS at 05:31

## 2018-07-11 RX ADMIN — HEPARIN SODIUM 5000 UNITS: 5000 INJECTION, SOLUTION INTRAVENOUS; SUBCUTANEOUS at 22:19

## 2018-07-11 NOTE — PLAN OF CARE
Problem: DISCHARGE PLANNING - CARE MANAGEMENT  Goal: Discharge to post-acute care or home with appropriate resources  INTERVENTIONS:  - Conduct assessment to determine patient/family and health care team treatment goals, and need for post-acute services based on payer coverage, community resources, and patient preferences, and barriers to discharge  - Address psychosocial, clinical, and financial barriers to discharge as identified in assessment in conjunction with the patient/family and health care team  - Arrange appropriate level of post-acute services according to patients   needs and preference and payer coverage in collaboration with the physician and health care team  - Communicate with and update the patient/family, physician, and health care team regarding progress on the discharge plan  - Arrange appropriate transportation to post-acute venues  Outcome: Completed Date Met: 07/11/18  CM scheduled patient with a new PCP appointment for July 18th at 2:30pm with Dr Montel Ormond [address] [place] CM informed pt and SLIM  Pt is in agreement  Pt has no other needs at this time

## 2018-07-11 NOTE — ASSESSMENT & PLAN NOTE
· Secondary to dehydration  · Creatinine has improved currently at 1 26  · No need for further imaging or nephrology consult at this time  · UA not suggestive of infection no need for culture at this time

## 2018-07-11 NOTE — SOCIAL WORK
CM scheduled patient with a new PCP appointment for July 18th at 2:30pm with Dr Yariel Lloyd [address] [place]  CM informed pt and SLIM  Pt is in agreement  Pt has no other needs at this time

## 2018-07-11 NOTE — DISCHARGE SUMMARY
Discharge- Eugenio Delarosa 1970, 46 y o  male MRN: [id number]    Unit/Bed#: -01 Encounter: [id number]    Primary Care Provider: Jaden Ontiveros MD   Date and time admitted to hospital: 7/10/2018  2:38 PM        No new Assessment & Plan notes have been filed under this hospital service since the last note was generated  Service: Hospitalist        Discharging Physician / Practitioner: CHELI Tillman  PCP: Jaden Ontiveros MD  Admission Date:   Admission Orders     Ordered        07/10/18 1701  Inpatient Admission (expected length of stay for this patient is greater than two midnights)  Once             Discharge Date: 07/12/18    Resolved Problems  Date Reviewed: 7/11/2018    None          Consultations During Hospital Stay:  · None    Procedures Performed:     · CT head no acute intracranial abnormalities  · CK 2338/1082/475  · CK MB 16/7 4  · Troponin x1 negative    Significant Findings / Test Results:     · Acute renal failure  · Acute hyperkalemia  · Rhabdomyolysis    Incidental Findings:   · None     Test Results Pending at Discharge (will require follow up): · None     Outpatient Tests Requested:  · CBC BMP CK    Complications:  None    Reason for Admission:  Acute renal failure/rhabdomyolysis    Hospital Course:     Eugenio Delarosa is a 46 y o  male patient who originally presented to the hospital on 7/10/2018 due to generally is feeling of not feeling well having cramping and weakness that did not improved with drinking several bottles water  Patient stated became dizzy nauseous left or control home where wife proceeded to call EMS and had patient brought to the ED  Patient had initial CT of the head that was negative  Patient's laboratory work showing evidence of acute renal failure with a creatinine of 2 93, potassium of 6 9, and elevated CK of 2338  Patient was admitted started on IV hydration overall clinically improved patient was discharged home with a PCP appointment      Please see above list of diagnoses and related plan for additional information  Condition at Discharge: stable     Discharge Day Visit / Exam:     Subjective:  Patient reports some ongoing muscle weakness in the lower extremities however overall feels clinically better  Patient states he recently took 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 patient states he does struggle with dehydration and they are trying to accommodate in find him at bedside time to help her take him as an employee  Patient recommended that he should receive another L of fluid and given his overall improvement could discharge home however should follow up with his PCP prior to returning to work  Vitals: Blood Pressure: 101/57 (07/12/18 0700)  Pulse: (!) 47 (07/12/18 0700)  Temperature: 98 °F (36 7 °C) (07/12/18 0700)  Temp Source: Oral (07/12/18 0700)  Respirations: 18 (07/12/18 0700)  Height: 6' 1" (185 4 cm) (07/10/18 1700)  Weight - Scale: 115 kg (253 lb 8 5 oz) (07/10/18 1700)  SpO2: 99 % (07/12/18 0700)  Exam:   Physical Exam   Constitutional: He is oriented to person, place, and time  He appears well-developed and well-nourished  HENT:   Head: Normocephalic and atraumatic  Eyes: Conjunctivae and EOM are normal    Neck: Normal range of motion  Cardiovascular: Normal rate, regular rhythm and normal heart sounds  Pulmonary/Chest: Effort normal and breath sounds normal    Abdominal: Soft  Bowel sounds are normal    Musculoskeletal: Normal range of motion  Neurological: He is alert and oriented to person, place, and time  Skin: Skin is warm and dry  Psychiatric: He has a normal mood and affect  His behavior is normal        Discussion with Family:  Wife at bedside    Discharge instructions/Information to patient and family:   See after visit summary for information provided to patient and family        Provisions for Follow-Up Care:  See after visit summary for information related to follow-up care and any pertinent home health orders  Disposition:     Home    For Discharges to [facility]:   · Not Applicable to this Patient - Not Applicable to this Patient    Planned Readmission:  None     Discharge Statement:  I spent 40 minutes discharging the patient  This time was spent on the day of discharge  I had direct contact with the patient on the day of discharge  Greater than 50% of the total time was spent examining patient, answering all patient questions, arranging and discussing plan of care with patient as well as directly providing post-discharge instructions  Additional time then spent on discharge activities  Discharge Medications:  See after visit summary for reconciled discharge medications provided to patient and family        ** Please Note: This note has been constructed using a voice recognition system **

## 2018-07-11 NOTE — PLAN OF CARE
CARDIOVASCULAR - ADULT     Maintains optimal cardiac output and hemodynamic stability Progressing     Absence of cardiac dysrhythmias or at baseline rhythm Progressing        DISCHARGE PLANNING     Discharge to home or other facility with appropriate resources Progressing        HEMATOLOGIC - ADULT     Maintains hematologic stability Progressing        INFECTION - ADULT     Absence or prevention of progression during hospitalization Progressing        Knowledge Deficit     Patient/family/caregiver demonstrates understanding of disease process, treatment plan, medications, and discharge instructions Progressing        METABOLIC, FLUID AND ELECTROLYTES - ADULT     Electrolytes maintained within normal limits Progressing     Fluid balance maintained Progressing        PAIN - ADULT     Verbalizes/displays adequate comfort level or baseline comfort level Progressing        SAFETY ADULT     Patient will remain free of falls Progressing     Maintain or return to baseline ADL function Progressing     Maintain or return mobility status to optimal level Progressing

## 2018-07-11 NOTE — PROGRESS NOTES
Progress Note - Natacha Mercado 1970, 46 y o  male MRN: [id number]    Unit/Bed#: -01 Encounter: [id number]    Primary Care Provider: Vanda Torrez MD   Date and time admitted to hospital: 7/10/2018  2:38 PM        * Acute renal failure (ARF) ([address] )   Assessment & Plan    · Secondary to dehydration  · Creatinine has improved currently at 1 26  · No need for further imaging or nephrology consult at this time  · UA not suggestive of infection no need for culture at this time  Acute hyperkalemia   Assessment & Plan    · Overall clinically resolved  · Potassium remaining stable currently at 4 3  · Continue monitor        Rhabdomyolysis   Assessment & Plan    · Present on admission  · Evident by muscle cramping aches pain  · Elevated CPK with improvement  · Patient overall clinically improved like to go home today  · Will give patient a 1 L bolus  · Repeat CPK improved patient discharged home with outpatient follow-up with his PCP             VTE Pharmacologic Prophylaxis:   Pharmacologic: Heparin  Mechanical VTE Prophylaxis in Place: Yes    Patient Centered Rounds: I have performed bedside rounds with nursing staff today  Discussions with Specialists or Other Care Team Provider:  [facility] attending    Education and Discussions with Family / Patient:  Patient wife at bedside    Time Spent for Care: 35 minutes  More than 50% of total time spent on counseling and coordination of care as described above      Current Length of Stay: 1 day(s)    Current Patient Status: Inpatient   Certification Statement: The patient will continue to require additional inpatient hospital stay due to Ongoing treatment for acute renal failure and rhabdomyolysis    Discharge Plan:  Next 24 hours    Code Status: Level 1 - Full Code      Subjective:   Patient continues to report ongoing weakness generalized discomfort however did want to discharge today however after receiving a bolus and repeat CK patient's levels did not come down he still symptomatic complaining of being thirsty a agreed after long discussion with him in the wife at the bedside education that he needed to stay get IV hydration and if everything correct can discharge home tomorrow  Objective:     Vitals:   Temp (24hrs), Av °F (36 7 °C), Min:97 7 °F (36 5 °C), Max:98 3 °F (36 8 °C)    HR:  [55-79] 61  Resp:  [16-18] 18  BP: ()/(51-88) 119/64  SpO2:  [97 %-98 %] 98 %  Body mass index is 33 45 kg/m²  Input and Output Summary (last 24 hours): Intake/Output Summary (Last 24 hours) at 18 1722  Last data filed at 18 1500   Gross per 24 hour   Intake             4580 ml   Output             1575 ml   Net             3005 ml       Physical Exam:     Physical Exam   Constitutional: He is oriented to person, place, and time  He appears well-developed and well-nourished  HENT:   Head: Normocephalic and atraumatic  Eyes: Conjunctivae and EOM are normal    Neck: Normal range of motion  Cardiovascular: Normal rate, regular rhythm and normal heart sounds  Pulmonary/Chest: Effort normal and breath sounds normal    Abdominal: Soft  Bowel sounds are normal    Musculoskeletal: Normal range of motion  Neurological: He is alert and oriented to person, place, and time  Skin: Skin is warm and dry  Psychiatric: He has a normal mood and affect   His behavior is normal          Additional Data:     Labs:      Results from last 7 days  Lab Units 07/10/18  1457   WBC Thousand/uL 13 24*   HEMOGLOBIN g/dL 17 3*   HEMATOCRIT % 48 7   PLATELETS Thousands/uL 392*   NEUTROS PCT % 76*   LYMPHS PCT % 12*   MONOS PCT % 10   EOS PCT % 0       Results from last 7 days  Lab Units 18  0434  07/10/18  1456   SODIUM mmol/L 139  < > 133*   POTASSIUM mmol/L 4 3  < > 6 9*   CHLORIDE mmol/L 108  < > 93*   CO2 mmol/L 28  < > 24   BUN mg/dL 18  < > 31*   CREATININE mg/dL 1 26  < > 2 93*   CALCIUM mg/dL 7 7*  < > 10 7*   TOTAL PROTEIN g/dL  --   --  9 7*   BILIRUBIN TOTAL mg/dL  --   --  2 30*   ALK PHOS U/L  --   --  95   ALT U/L  --   --  52   AST U/L  --   --  74*   GLUCOSE RANDOM mg/dL 99  < > 97   < > = values in this interval not displayed  * I Have Reviewed All Lab Data Listed Above  * Additional Pertinent Lab Tests Reviewed: Surya Pollack Admission Reviewed        Recent Cultures (last 7 days):           Last 24 Hours Medication List:     Current Facility-Administered Medications:  acetaminophen 650 mg Oral Q6H PRN CHELI Boykin   heparin (porcine) 5,000 Units Subcutaneous Q8H [facility] Angela Meza MD   nicotine 1 patch Transdermal Daily Angela Meza MD   sodium chloride 125 mL/hr Intravenous Continuous CHELI Boykin        Today, Patient Was Seen By: CHELI Boykin    ** Please Note: Dictation voice to text software may have been used in the creation of this document   **

## 2018-07-11 NOTE — CASE MANAGEMENT
Initial Clinical Review    Admission: Date/Time/Statement: 7/10/18 @ 1701     Orders Placed This Encounter   Procedures    Inpatient Admission (expected length of stay for this patient is greater than two midnights)     Standing Status:   Standing     Number of Occurrences:   1     Order Specific Question:   Admitting Physician     Answer:   Manoj Copeland     Order Specific Question:   Level of Care     Answer:   Med Surg [16]     Order Specific Question:   Estimated length of stay     Answer:   More than 2 Midnights     Order Specific Question:   Certification     Answer:   I certify that inpatient services are medically necessary for this patient for a duration of greater than two midnights  See H&P and MD Progress Notes for additional information about the patient's course of treatment  ED: Date/Time/Mode of Arrival:   ED Arrival Information     Expected Arrival Acuity Means of Arrival Escorted By Service Admission Type    - 7/10/2018 14:38 Urgent Ambulance [facility]) General Medicine Urgent    Arrival Complaint    Dizzy          Chief Complaint:   Chief Complaint   Patient presents with    Heat Exposure     Patient stated that 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 and disoriented  Patient drove home however doesnt know how he got there  History of Annia Guero is a 46 y o  male who presents with generalized cramping and weakness  Patient states since yesterday he has not been feeling well  He is a  and is outside a lot  He states that he does drink several bottles of water a day but yesterday he just started cramping  He just felt like he was having a heart attack as well but no such chest pain but just generalized aches and pains  He felt very nauseous and dizzy as well  The patient states he gets this way every now and then in in the past he has been told that he gets severely dehydrated    He is not on any current medications and has had no change to his diet  He is feeling significantly better since he has been here in the emergency department          ED Vital Signs:   ED Triage Vitals   Temperature Pulse Respirations Blood Pressure SpO2   07/10/18 1445 07/10/18 1445 07/10/18 1445 07/10/18 1518 07/10/18 1445   98 3 °F (36 8 °C) 66 17 149/82 98 %      Temp Source Heart Rate Source Patient Position - Orthostatic VS BP Location FiO2 (%)   07/10/18 1445 07/10/18 1445 07/10/18 1445 07/10/18 1445 --   Oral Monitor Lying Right arm       Pain Score       07/10/18 1800       No Pain        Wt Readings from Last 1 Encounters:   07/10/18 115 kg (253 lb 8 5 oz)       Vital Signs (abnormal): wnl     Abnormal Labs/Diagnostic Test Results: total CK   2338, BUN creat   30  2 17, gluc   50, Na   133, K   6 9, cl    93, an gap   16, BUN creat    31  2 93, milan   10 7, ast   74, total prot  9 7, alb   5 9, total bili 2 30, wbc  13 24, H&H   17 3   48 7, plt   392  CT head- wnl    ED Treatment:   Medication Administration from 07/10/2018 1438 to 07/10/2018 1751       Date/Time Order Dose Route Action Action by Comments     07/10/2018 1515 diazepam (VALIUM) injection 5 mg 5 mg Intravenous Given Cary Perez RN      07/10/2018 1541 insulin regular (HumuLIN R,NovoLIN R) injection 10 Units 10 Units Intravenous Given Allison Ravi RN      07/10/2018 1540 dextrose 50 % IV solution 25 g 25 g Intravenous Given Allison Ravi RN      07/10/2018 1616 calcium gluconate 1 g in sodium chloride 0 9 % 100 mL IVPB 0 g Intravenous Stopped Allison Ravi RN      07/10/2018 1553 calcium gluconate 1 g in sodium chloride 0 9 % 100 mL IVPB 1 g Intravenous [address] Allison Ravi RN      07/10/2018 1616 sodium chloride 0 9 % bolus 2,000 mL 0 mL Intravenous Stopped Allison Ravi RN      07/10/2018 1553 sodium chloride 0 9 % bolus 2,000 mL 2,000 mL Intravenous [address] Allison Ravi RN      07/10/2018 1548 sodium polystyrene sulfonate (KAYEXALATE) oral suspension 30 g 30 g Oral Given Lizy Luevano RN      07/10/2018 1745 sodium chloride 0 9 % bolus 2,000 mL 0 mL Intravenous Stopped Lizy Luevano RN      07/10/2018 1649 sodium chloride 0 9 % bolus 2,000 mL 2,000 mL Intravenous New Bag Lizy Luevano RN           Past Medical/Surgical History: Active Ambulatory Problems     Diagnosis Date Noted    No Active Ambulatory Problems     Resolved Ambulatory Problems     Diagnosis Date Noted    No Resolved Ambulatory Problems     Past Medical History:   Diagnosis Date    Asthma     Gout        Admitting Diagnosis: Hyperkalemia [E87 5]  Dizziness [R42]  SAJAN (acute kidney injury) ([facility] [address] ) [N17 9]    Age/Sex: 46 y o  male    Assessment/Plan:   Acute renal failure (ARF) ([facility] [address] )   Assessment & Plan     Secondary to dehydration  Encourage hydration  If the patient does not improve tomorrow will do some further imaging such as a renal ultrasound and have Nephrology evaluate the patient  I do not feel it is necessary at this time  Will check a urinalysis if not sent  Patient also has evidence of rhabdomyolysis  Check CPK level in the morning           Acute hyperkalemia   Assessment & Plan     Hyperkalemia: This is likely secondary to severe dehydration  The patient did have some EKG changes and was treated here in the emergency department  Repeat potassium level is 4 1  Continue to monitor on telemetry and check another level in the morning               VTE Prophylaxis: Heparin  / sequential compression device   Code Status:  Full code  POLST: POLST is not applicable to this patient  Discussion with family:  Wife at the bedside   Anticipated Length of Stay:  Patient will be admitted on an Inpatient basis with an anticipated length of stay of  greater than 2 midnights     Justification for Hospital Stay:  Greater than 2 midnights secondary to having acute renal failure and severe hypokalemia with EKG changes needing aggressive hydration      Admission Orders:  Scheduled Meds:   Current Facility-Administered Medications:  acetaminophen 650 mg Oral Q6H PRN Lynda Gonzalez MD    heparin (porcine) 5,000 Units Subcutaneous Q8H [facility] Lynda Gonzalez MD    nicotine 1 patch Transdermal Daily Lynda Gonzalez MD    sodium chloride 125 mL/hr Intravenous Continuous Lynda Gonzalez MD Last Rate: 125 mL/hr (07/11/18 0204)     Continuous Infusions:   sodium chloride 125 mL/hr Last Rate: 125 mL/hr (07/11/18 0204)     PRN Meds:   acetaminophen    Reg diet   I&O   Up and OOB as chase   Tele   7/11  CKMB , CK , mg , bmp   An gap   3  Dario   7 7  Total CK   1082, CK MB fraction   7 4

## 2018-07-11 NOTE — ASSESSMENT & PLAN NOTE
· Present on admission  · Evident by muscle cramping aches pain  · Elevated CPK with improvement  · Patient overall clinically improved like to go home today  · Will give patient a 1 L bolus  · Repeat CPK improved patient discharged home with outpatient follow-up with his PCP

## 2018-07-11 NOTE — TELEPHONE ENCOUNTER
from [facility] called to set up a new patient appt with dr Wong Henriquez  He is scheduled for 7/18/18, would this be considered a new patient appt or a tcm?  Please advise

## 2018-07-12 VITALS
HEIGHT: 73 IN | DIASTOLIC BLOOD PRESSURE: 57 MMHG | BODY MASS INDEX: 33.6 KG/M2 | SYSTOLIC BLOOD PRESSURE: 101 MMHG | RESPIRATION RATE: 18 BRPM | OXYGEN SATURATION: 99 % | WEIGHT: 253.53 LBS | HEART RATE: 47 BPM | TEMPERATURE: 98 F

## 2018-07-12 LAB
ANION GAP SERPL CALCULATED.3IONS-SCNC: 5 MMOL/L (ref 4–13)
BUN SERPL-MCNC: 12 MG/DL (ref 5–25)
CALCIUM SERPL-MCNC: 8.1 MG/DL (ref 8.3–10.1)
CHLORIDE SERPL-SCNC: 108 MMOL/L (ref 100–108)
CK MB SERPL-MCNC: 2.7 NG/ML (ref 0–5)
CK MB SERPL-MCNC: <1 % (ref 0–2.5)
CK SERPL-CCNC: 465 U/L (ref 39–308)
CO2 SERPL-SCNC: 25 MMOL/L (ref 21–32)
CREAT SERPL-MCNC: 0.85 MG/DL (ref 0.6–1.3)
GFR SERPL CREATININE-BSD FRML MDRD: 102 ML/MIN/1.73SQ M
GLUCOSE SERPL-MCNC: 96 MG/DL (ref 65–140)
POTASSIUM SERPL-SCNC: 4 MMOL/L (ref 3.5–5.3)
SODIUM SERPL-SCNC: 138 MMOL/L (ref 136–145)

## 2018-07-12 PROCEDURE — 80048 BASIC METABOLIC PNL TOTAL CA: CPT | Performed by: NURSE PRACTITIONER

## 2018-07-12 PROCEDURE — 99239 HOSP IP/OBS DSCHRG MGMT >30: CPT | Performed by: NURSE PRACTITIONER

## 2018-07-12 PROCEDURE — 82550 ASSAY OF CK (CPK): CPT | Performed by: NURSE PRACTITIONER

## 2018-07-12 PROCEDURE — 82553 CREATINE MB FRACTION: CPT | Performed by: NURSE PRACTITIONER

## 2018-07-12 RX ADMIN — SODIUM CHLORIDE 125 ML/HR: 0.9 INJECTION, SOLUTION INTRAVENOUS at 01:32

## 2018-07-12 RX ADMIN — HEPARIN SODIUM 5000 UNITS: 5000 INJECTION, SOLUTION INTRAVENOUS; SUBCUTANEOUS at 05:58

## 2018-07-12 NOTE — PLAN OF CARE
Problem: DISCHARGE PLANNING - CARE MANAGEMENT  Goal: Discharge to post-acute care or home with appropriate resources  INTERVENTIONS:  - Conduct assessment to determine patient/family and health care team treatment goals, and need for post-acute services based on payer coverage, community resources, and patient preferences, and barriers to discharge  - Address psychosocial, clinical, and financial barriers to discharge as identified in assessment in conjunction with the patient/family and health care team  - Arrange appropriate level of post-acute services according to patients   needs and preference and payer coverage in collaboration with the physician and health care team  - Communicate with and update the patient/family, physician, and health care team regarding progress on the discharge plan  - Arrange appropriate transportation to post-acute venues   Outcome: Completed Date Met: 07/12/18  CM met with pt at [facility] in regards to DC plan  PA requested pt see PCP close to his house  Pt will see PCP in [facility] ([address] [place] [address], [place] [address])  CM set up appointment for July 19th at 1:20 pm and cancelled appointment with Dr Lauren Peña for July 18th  Pt has no other needs at this time

## 2018-07-12 NOTE — SOCIAL WORK
CM met with pt at [facility] in regards to DC plan  PA requested pt see PCP close to his house  Pt will see PCP in [facility] ([facility] [address], [place] [address])  ELIZABETH set up appointment for July 19th at 1:20 pm and cancelled appointment with Dr Jennifer Mcleod for July 18th  Pt has no other needs at this time

## 2018-07-19 ENCOUNTER — OFFICE VISIT (OUTPATIENT)
Dept: FAMILY MEDICINE CLINIC | Facility: CLINIC | Age: 48
End: 2018-07-19
Payer: COMMERCIAL

## 2018-07-19 VITALS
BODY MASS INDEX: 30.27 KG/M2 | SYSTOLIC BLOOD PRESSURE: 120 MMHG | TEMPERATURE: 98.1 F | HEIGHT: 73 IN | HEART RATE: 60 BPM | OXYGEN SATURATION: 98 % | WEIGHT: 228.4 LBS | DIASTOLIC BLOOD PRESSURE: 78 MMHG

## 2018-07-19 DIAGNOSIS — R25.2 MUSCLE CRAMPING: ICD-10-CM

## 2018-07-19 DIAGNOSIS — R61 EXCESSIVE SWEATING: ICD-10-CM

## 2018-07-19 DIAGNOSIS — M25.50 ARTHRALGIA, UNSPECIFIED JOINT: ICD-10-CM

## 2018-07-19 DIAGNOSIS — M62.89 MUSCLE STIFFNESS: ICD-10-CM

## 2018-07-19 DIAGNOSIS — M62.89 MUSCLE STIFFNESS: Primary | ICD-10-CM

## 2018-07-19 DIAGNOSIS — R61 HYPERHIDROSIS: ICD-10-CM

## 2018-07-19 DIAGNOSIS — E87.5 ACUTE HYPERKALEMIA: ICD-10-CM

## 2018-07-19 DIAGNOSIS — N17.9 ACUTE RENAL FAILURE, UNSPECIFIED ACUTE RENAL FAILURE TYPE (HCC): ICD-10-CM

## 2018-07-19 DIAGNOSIS — M62.82 NON-TRAUMATIC RHABDOMYOLYSIS: Primary | ICD-10-CM

## 2018-07-19 PROCEDURE — 99204 OFFICE O/P NEW MOD 45 MIN: CPT | Performed by: NURSE PRACTITIONER

## 2018-07-19 PROCEDURE — 3008F BODY MASS INDEX DOCD: CPT | Performed by: NURSE PRACTITIONER

## 2018-07-19 PROCEDURE — 1111F DSCHRG MED/CURRENT MED MERGE: CPT | Performed by: NURSE PRACTITIONER

## 2018-07-19 NOTE — LETTER
To Whom it Concerns;         Please allow patient to do light duty inside at work until he is fully evaluated   Thank you        Glorai López MSN FNP-BC

## 2018-07-19 NOTE — LETTER
July 19, 2018     Kaelyn Dick  [address]  [address] [place] [address]    Patient: Kaelyn Dick   YOB: 1970   Date of Visit: 7/19/2018       To Whom is concerns; Patient here in office today and must be on light duty inside work until he is fully evaluated  If you need additional information please let us know          Sincerely,        CHELI Lyn        CC: No Recipients  CHELI Lyn  7/19/2018  1:48 PM  Incomplete  Assessment/Plan:     No problem-specific Assessment & Plan notes found for this encounter  {Assess/PlanSmartLinks:[id number]}     Subjective:     Patient ID: Kaelyn Dick is a 46 y o  male  Kaelyn Dick is a 46 y o  male patient who originally presented to the hospital on 7/10/2018 due to generally is feeling of not feeling well having cramping and weakness that did not improved with drinking several bottles water  Patient stated became dizzy nauseous left or control home where wife proceeded to call EMS and had patient brought to the ED  Patient had initial CT of the head that was negative  Patient's laboratory work showing evidence of acute renal failure with a creatinine of 2 93, potassium of 6 9, and elevated CK of 2338  Patient was admitted started on IV hydration overall clinically improved patient was discharged home with a PCP appointment  Patient here today for follow up and reports that he is here today with his wife  Patient reports that he has had similar episodes int he past with dehydration and cramping and cramping at night  Patient is normally very much sweating all the time but worse in the summer with humidity and the heat  Patient in the past would drink Pedialyte and resting and rehydrating  Patient is currently not having any medications  Patient is sweating all day long and reports that he would drinking fluids and not urinating all day   Patient now is feeling better but is getting headaches every day now headaches are coming and going in the front of his head dull headache  Patient is due for labs to recheck  Patient has issues chronically with cramping and happening all the time when he sweats is primarily his upper chest and back and having salt stains under his arms no night sweats Patient had been evaluated by former PCP for similar type complaints and had work up and reports that he was referred to rheumatology and was unable to make an appointment and needs to schedule for follow up            Review of Systems   Constitutional: Negative  HENT: Negative  Eyes: Negative  Respiratory: Negative  Cardiovascular: Negative  Gastrointestinal: Negative  Endocrine: Negative  Genitourinary: Negative  Musculoskeletal: Positive for arthralgias  Lack of flexibility    Skin:        Sweating    Allergic/Immunologic: Negative  Neurological: Positive for headaches  Hematological: Negative  Psychiatric/Behavioral: Negative  Objective:     Physical Exam   Constitutional: He is oriented to person, place, and time  Vital signs are normal  He appears well-developed and well-nourished  No distress  HENT:   Head: Normocephalic and atraumatic  Eyes: Pupils are equal, round, and reactive to light  Neck: Normal range of motion  No thyromegaly present  Cardiovascular: Normal rate, regular rhythm, normal heart sounds and intact distal pulses  No murmur heard  Pulmonary/Chest: Effort normal and breath sounds normal  No respiratory distress  He has no wheezes  Abdominal: Soft  Bowel sounds are normal    Musculoskeletal: Normal range of motion  Limited flexibility in his arms and legs and cramping with overextending his arms    Neurological: He is alert and oriented to person, place, and time  Skin: Skin is warm and dry  Psychiatric: He has a normal mood and affect  Nursing note and vitals reviewed          Vitals:    07/19/18 1310   BP: 120/78   Cuff Size: Standard   Pulse: 60   Temp: 98 1 °F (36 7 °C)   TempSrc: Tympanic   SpO2: 98%   Weight: 104 kg (228 lb 6 4 oz)   Height: 6' 1" (1 854 m)       Transitional Care Management Review:  Brigitte Carmona is a 46 y o  male here for TCM follow up       During the TCM phone call patient stated:               CHELI Amaya

## 2018-07-19 NOTE — PROGRESS NOTES
Assessment/Plan:     No problem-specific Assessment & Plan notes found for this encounter  Diagnoses and all orders for this visit:    Non-traumatic rhabdomyolysis  -     CK (with reflex to MB); Future  -     Comprehensive metabolic panel; Future  -     Ambulatory referral to Rheumatology; Future    Acute hyperkalemia  Comments:  will update his labs to evaluate for resolution of his symptoms     Acute renal failure, unspecified acute renal failure type ([address] )  -     CK (with reflex to MB); Future  -     Comprehensive metabolic panel; Future  -     Ambulatory referral to Rheumatology; Future    Arthralgia, unspecified joint  -     Ambulatory referral to Rheumatology; Future    Hyperhidrosis  -     Ambulatory referral to Rheumatology; Future    Muscle stiffness  Comments:  Discussed with patient will refer to rheumatology discussed appearing to have a rheumatologic versus a motor neuron complaints   Orders:  -     CK (with reflex to MB); Future  -     Comprehensive metabolic panel; Future  -     Ambulatory referral to Rheumatology; Future         Subjective:     Patient ID: Natacha Mercado is a 46 y o  male  Natacha Mercado is a 46 y o  male patient who originally presented to the hospital on 7/10/2018 due to generally is feeling of not feeling well having cramping and weakness that did not improved with drinking several bottles water  Patient stated became dizzy nauseous left or control home where wife proceeded to call EMS and had patient brought to the ED  Patient had initial CT of the head that was negative  Patient's laboratory work showing evidence of acute renal failure with a creatinine of 2 93, potassium of 6 9, and elevated CK of 2338  Patient was admitted started on IV hydration overall clinically improved patient was discharged home with a PCP appointment  Patient here today for follow up and reports that he is here today with his wife   Patient reports that he has had similar episodes int he past with dehydration and cramping and cramping at night  Patient is normally very much sweating all the time but worse in the summer with humidity and the heat  Patient in the past would drink Pedialyte and resting and rehydrating  Patient is currently not having any medications  Patient is sweating all day long and reports that he would drinking fluids and not urinating all day  Patient now is feeling better but is getting headaches every day now headaches are coming and going in the front of his head dull headache  Patient is due for labs to recheck  Patient has issues chronically with cramping and happening all the time when he sweats is primarily his upper chest and back and having salt stains under his arms no night sweats Patient had been evaluated by former PCP for similar type complaints and had work up and reports that he was referred to rheumatology and was unable to make an appointment and needs to schedule for follow up            Review of Systems   Constitutional: Negative  HENT: Negative  Eyes: Negative  Respiratory: Negative  Cardiovascular: Negative  Gastrointestinal: Negative  Endocrine: Negative  Genitourinary: Negative  Musculoskeletal: Positive for arthralgias  Lack of flexibility    Skin:        Sweating    Allergic/Immunologic: Negative  Neurological: Positive for headaches  Hematological: Negative  Psychiatric/Behavioral: Negative  Objective:     Physical Exam   Constitutional: He is oriented to person, place, and time  Vital signs are normal  He appears well-developed and well-nourished  No distress  HENT:   Head: Normocephalic and atraumatic  Eyes: Pupils are equal, round, and reactive to light  Neck: Normal range of motion  No thyromegaly present  Cardiovascular: Normal rate, regular rhythm, normal heart sounds and intact distal pulses  No murmur heard    Pulmonary/Chest: Effort normal and breath sounds normal  No respiratory distress  He has no wheezes  Abdominal: Soft  Bowel sounds are normal    Musculoskeletal: Normal range of motion  Limited flexibility in his arms and legs and cramping with overextending his arms    Neurological: He is alert and oriented to person, place, and time  Skin: Skin is warm and dry  Psychiatric: He has a normal mood and affect  Nursing note and vitals reviewed  Vitals:    07/19/18 1310   BP: 120/78   Cuff Size: Standard   Pulse: 60   Temp: 98 1 °F (36 7 °C)   TempSrc: Tympanic   SpO2: 98%   Weight: 104 kg (228 lb 6 4 oz)   Height: 6' 1" (1 854 m)       Transitional Care Management Review:  Ru Casatneda is a 46 y o  male here for TCM follow up       During the TCM phone call patient stated:               CHELI Farrell

## 2018-07-21 ENCOUNTER — APPOINTMENT (OUTPATIENT)
Dept: LAB | Facility: CLINIC | Age: 48
End: 2018-07-21
Payer: COMMERCIAL

## 2018-07-21 ENCOUNTER — TRANSCRIBE ORDERS (OUTPATIENT)
Dept: ADMINISTRATIVE | Facility: HOSPITAL | Age: 48
End: 2018-07-21

## 2018-07-21 DIAGNOSIS — M62.82 NON-TRAUMATIC RHABDOMYOLYSIS: ICD-10-CM

## 2018-07-21 DIAGNOSIS — M62.89 MUSCLE STIFFNESS: ICD-10-CM

## 2018-07-21 DIAGNOSIS — R25.2 MUSCLE CRAMPING: ICD-10-CM

## 2018-07-21 DIAGNOSIS — N17.9 ACUTE RENAL FAILURE, UNSPECIFIED ACUTE RENAL FAILURE TYPE (HCC): ICD-10-CM

## 2018-07-21 DIAGNOSIS — R61 EXCESSIVE SWEATING: ICD-10-CM

## 2018-07-21 LAB
ALBUMIN SERPL BCP-MCNC: 4.2 G/DL (ref 3.5–5)
ALP SERPL-CCNC: 86 U/L (ref 46–116)
ALT SERPL W P-5'-P-CCNC: 36 U/L (ref 12–78)
ANION GAP SERPL CALCULATED.3IONS-SCNC: 3 MMOL/L (ref 4–13)
AST SERPL W P-5'-P-CCNC: 14 U/L (ref 5–45)
BILIRUB SERPL-MCNC: 0.62 MG/DL (ref 0.2–1)
BUN SERPL-MCNC: 14 MG/DL (ref 5–25)
CALCIUM SERPL-MCNC: 9.3 MG/DL (ref 8.3–10.1)
CHLORIDE SERPL-SCNC: 104 MMOL/L (ref 100–108)
CK SERPL-CCNC: 97 U/L (ref 39–308)
CO2 SERPL-SCNC: 32 MMOL/L (ref 21–32)
CREAT SERPL-MCNC: 1.01 MG/DL (ref 0.6–1.3)
GFR SERPL CREATININE-BSD FRML MDRD: 86 ML/MIN/1.73SQ M
GLUCOSE P FAST SERPL-MCNC: 68 MG/DL (ref 65–99)
POTASSIUM SERPL-SCNC: 4.6 MMOL/L (ref 3.5–5.3)
PROT SERPL-MCNC: 7.3 G/DL (ref 6.4–8.2)
SODIUM SERPL-SCNC: 139 MMOL/L (ref 136–145)

## 2018-07-21 PROCEDURE — 36415 COLL VENOUS BLD VENIPUNCTURE: CPT

## 2018-07-21 PROCEDURE — 80053 COMPREHEN METABOLIC PANEL: CPT

## 2018-07-21 PROCEDURE — 82550 ASSAY OF CK (CPK): CPT

## 2018-07-21 PROCEDURE — 83519 RIA NONANTIBODY: CPT

## 2018-07-24 LAB — GAD65 AB SER-ACNC: <5 U/ML (ref 0–5)

## 2018-09-13 ENCOUNTER — TRANSCRIBE ORDERS (OUTPATIENT)
Dept: ADMINISTRATIVE | Facility: HOSPITAL | Age: 48
End: 2018-09-13

## 2018-09-13 ENCOUNTER — APPOINTMENT (OUTPATIENT)
Dept: LAB | Facility: CLINIC | Age: 48
End: 2018-09-13
Payer: COMMERCIAL

## 2018-09-13 DIAGNOSIS — M15.0 PRIMARY GENERALIZED HYPERTROPHIC OSTEOARTHROSIS: ICD-10-CM

## 2018-09-13 DIAGNOSIS — Z79.899 NEED FOR PROPHYLACTIC CHEMOTHERAPY: ICD-10-CM

## 2018-09-13 DIAGNOSIS — N17.9 ACUTE RENAL FAILURE, UNSPECIFIED ACUTE RENAL FAILURE TYPE (HCC): Primary | ICD-10-CM

## 2018-09-13 DIAGNOSIS — L74.519 PRIMARY FOCAL HYPERHIDROSIS: ICD-10-CM

## 2018-09-13 DIAGNOSIS — N17.9 ACUTE RENAL FAILURE, UNSPECIFIED ACUTE RENAL FAILURE TYPE (HCC): ICD-10-CM

## 2018-09-13 LAB
ALBUMIN SERPL BCP-MCNC: 4 G/DL (ref 3.5–5)
ALP SERPL-CCNC: 79 U/L (ref 46–116)
ALT SERPL W P-5'-P-CCNC: 36 U/L (ref 12–78)
ANION GAP SERPL CALCULATED.3IONS-SCNC: 5 MMOL/L (ref 4–13)
AST SERPL W P-5'-P-CCNC: 22 U/L (ref 5–45)
BASOPHILS # BLD AUTO: 0.07 THOUSANDS/ΜL (ref 0–0.1)
BASOPHILS NFR BLD AUTO: 1 % (ref 0–1)
BILIRUB SERPL-MCNC: 0.65 MG/DL (ref 0.2–1)
BILIRUB UR QL STRIP: NEGATIVE
BUN SERPL-MCNC: 16 MG/DL (ref 5–25)
CALCIUM SERPL-MCNC: 8.9 MG/DL (ref 8.3–10.1)
CHLORIDE SERPL-SCNC: 103 MMOL/L (ref 100–108)
CK SERPL-CCNC: 144 U/L (ref 39–308)
CLARITY UR: CLEAR
CO2 SERPL-SCNC: 28 MMOL/L (ref 21–32)
COLOR UR: YELLOW
CREAT SERPL-MCNC: 1.07 MG/DL (ref 0.6–1.3)
EOSINOPHIL # BLD AUTO: 0.15 THOUSAND/ΜL (ref 0–0.61)
EOSINOPHIL NFR BLD AUTO: 3 % (ref 0–6)
ERYTHROCYTE [DISTWIDTH] IN BLOOD BY AUTOMATED COUNT: 11.7 % (ref 11.6–15.1)
ERYTHROCYTE [SEDIMENTATION RATE] IN BLOOD: 2 MM/HOUR (ref 0–10)
GFR SERPL CREATININE-BSD FRML MDRD: 81 ML/MIN/1.73SQ M
GLUCOSE SERPL-MCNC: 102 MG/DL (ref 65–140)
GLUCOSE UR STRIP-MCNC: NEGATIVE MG/DL
HCT VFR BLD AUTO: 43.6 % (ref 36.5–49.3)
HGB BLD-MCNC: 14.7 G/DL (ref 12–17)
HGB UR QL STRIP.AUTO: NEGATIVE
IMM GRANULOCYTES # BLD AUTO: 0.01 THOUSAND/UL (ref 0–0.2)
IMM GRANULOCYTES NFR BLD AUTO: 0 % (ref 0–2)
KETONES UR STRIP-MCNC: NEGATIVE MG/DL
LEUKOCYTE ESTERASE UR QL STRIP: NEGATIVE
LYMPHOCYTES # BLD AUTO: 1.6 THOUSANDS/ΜL (ref 0.6–4.47)
LYMPHOCYTES NFR BLD AUTO: 32 % (ref 14–44)
MCH RBC QN AUTO: 29.8 PG (ref 26.8–34.3)
MCHC RBC AUTO-ENTMCNC: 33.7 G/DL (ref 31.4–37.4)
MCV RBC AUTO: 88 FL (ref 82–98)
MONOCYTES # BLD AUTO: 0.48 THOUSAND/ΜL (ref 0.17–1.22)
MONOCYTES NFR BLD AUTO: 10 % (ref 4–12)
NEUTROPHILS # BLD AUTO: 2.75 THOUSANDS/ΜL (ref 1.85–7.62)
NEUTS SEG NFR BLD AUTO: 54 % (ref 43–75)
NITRITE UR QL STRIP: NEGATIVE
NRBC BLD AUTO-RTO: 0 /100 WBCS
PH UR STRIP.AUTO: 6 [PH] (ref 4.5–8)
PLATELET # BLD AUTO: 236 THOUSANDS/UL (ref 149–390)
PMV BLD AUTO: 9.6 FL (ref 8.9–12.7)
POTASSIUM SERPL-SCNC: 4 MMOL/L (ref 3.5–5.3)
PROT SERPL-MCNC: 7.4 G/DL (ref 6.4–8.2)
PROT UR STRIP-MCNC: NEGATIVE MG/DL
RBC # BLD AUTO: 4.94 MILLION/UL (ref 3.88–5.62)
SODIUM SERPL-SCNC: 136 MMOL/L (ref 136–145)
SP GR UR STRIP.AUTO: 1.02 (ref 1–1.03)
TSH SERPL DL<=0.05 MIU/L-ACNC: 1.77 UIU/ML (ref 0.36–3.74)
URATE SERPL-MCNC: 8 MG/DL (ref 4.2–8)
UROBILINOGEN UR QL STRIP.AUTO: 0.2 E.U./DL
WBC # BLD AUTO: 5.06 THOUSAND/UL (ref 4.31–10.16)

## 2018-09-13 PROCEDURE — 86618 LYME DISEASE ANTIBODY: CPT

## 2018-09-13 PROCEDURE — 86200 CCP ANTIBODY: CPT

## 2018-09-13 PROCEDURE — 81003 URINALYSIS AUTO W/O SCOPE: CPT | Performed by: PHYSICIAN ASSISTANT

## 2018-09-13 PROCEDURE — 84443 ASSAY THYROID STIM HORMONE: CPT

## 2018-09-13 PROCEDURE — 85652 RBC SED RATE AUTOMATED: CPT

## 2018-09-13 PROCEDURE — 36415 COLL VENOUS BLD VENIPUNCTURE: CPT

## 2018-09-13 PROCEDURE — 84550 ASSAY OF BLOOD/URIC ACID: CPT

## 2018-09-13 PROCEDURE — 86430 RHEUMATOID FACTOR TEST QUAL: CPT

## 2018-09-13 PROCEDURE — 80053 COMPREHEN METABOLIC PANEL: CPT

## 2018-09-13 PROCEDURE — 85025 COMPLETE CBC W/AUTO DIFF WBC: CPT

## 2018-09-13 PROCEDURE — 86038 ANTINUCLEAR ANTIBODIES: CPT

## 2018-09-13 PROCEDURE — 82550 ASSAY OF CK (CPK): CPT

## 2018-09-14 LAB
B BURGDOR IGG SER IA-ACNC: 0.1
B BURGDOR IGM SER IA-ACNC: 0.62
RHEUMATOID FACT SER QL LA: NEGATIVE
RYE IGE QN: NEGATIVE

## 2018-09-15 ENCOUNTER — APPOINTMENT (OUTPATIENT)
Dept: RADIOLOGY | Facility: CLINIC | Age: 48
End: 2018-09-15
Payer: COMMERCIAL

## 2018-09-15 ENCOUNTER — TRANSCRIBE ORDERS (OUTPATIENT)
Dept: ADMINISTRATIVE | Facility: HOSPITAL | Age: 48
End: 2018-09-15

## 2018-09-15 DIAGNOSIS — M15.0 PRIMARY GENERALIZED HYPERTROPHIC OSTEOARTHROSIS: ICD-10-CM

## 2018-09-15 DIAGNOSIS — M15.0 PRIMARY GENERALIZED HYPERTROPHIC OSTEOARTHROSIS: Primary | ICD-10-CM

## 2018-09-15 LAB — CCP IGA+IGG SERPL IA-ACNC: 3 UNITS (ref 0–19)

## 2018-09-15 PROCEDURE — 73080 X-RAY EXAM OF ELBOW: CPT

## 2018-09-15 PROCEDURE — 73110 X-RAY EXAM OF WRIST: CPT

## 2018-09-15 PROCEDURE — 73130 X-RAY EXAM OF HAND: CPT

## 2018-10-27 ENCOUNTER — TRANSCRIBE ORDERS (OUTPATIENT)
Dept: ADMINISTRATIVE | Facility: HOSPITAL | Age: 48
End: 2018-10-27

## 2018-10-27 ENCOUNTER — APPOINTMENT (OUTPATIENT)
Dept: LAB | Facility: CLINIC | Age: 48
End: 2018-10-27
Payer: COMMERCIAL

## 2018-10-27 DIAGNOSIS — M15.0 PRIMARY GENERALIZED HYPERTROPHIC OSTEOARTHROSIS: ICD-10-CM

## 2018-10-27 DIAGNOSIS — Z79.899 NEED FOR PROPHYLACTIC CHEMOTHERAPY: Primary | ICD-10-CM

## 2018-10-27 DIAGNOSIS — I10 ESSENTIAL HYPERTENSION, BENIGN: ICD-10-CM

## 2018-10-27 DIAGNOSIS — E79.0 URICACIDEMIA: ICD-10-CM

## 2018-10-27 DIAGNOSIS — Z79.899 NEED FOR PROPHYLACTIC CHEMOTHERAPY: ICD-10-CM

## 2018-10-27 LAB
ALBUMIN SERPL BCP-MCNC: 4 G/DL (ref 3.5–5)
ALP SERPL-CCNC: 87 U/L (ref 46–116)
ALT SERPL W P-5'-P-CCNC: 49 U/L (ref 12–78)
ANION GAP SERPL CALCULATED.3IONS-SCNC: 6 MMOL/L (ref 4–13)
AST SERPL W P-5'-P-CCNC: 29 U/L (ref 5–45)
BILIRUB SERPL-MCNC: 0.46 MG/DL (ref 0.2–1)
BUN SERPL-MCNC: 14 MG/DL (ref 5–25)
CALCIUM SERPL-MCNC: 9 MG/DL (ref 8.3–10.1)
CHLORIDE SERPL-SCNC: 105 MMOL/L (ref 100–108)
CO2 SERPL-SCNC: 28 MMOL/L (ref 21–32)
CREAT SERPL-MCNC: 1.03 MG/DL (ref 0.6–1.3)
GFR SERPL CREATININE-BSD FRML MDRD: 84 ML/MIN/1.73SQ M
GLUCOSE P FAST SERPL-MCNC: 69 MG/DL (ref 65–99)
POTASSIUM SERPL-SCNC: 4.1 MMOL/L (ref 3.5–5.3)
PROT SERPL-MCNC: 7.1 G/DL (ref 6.4–8.2)
SODIUM SERPL-SCNC: 139 MMOL/L (ref 136–145)
URATE SERPL-MCNC: 6 MG/DL (ref 4.2–8)

## 2018-10-27 PROCEDURE — 84550 ASSAY OF BLOOD/URIC ACID: CPT

## 2018-10-27 PROCEDURE — 80053 COMPREHEN METABOLIC PANEL: CPT

## 2018-10-27 PROCEDURE — 36415 COLL VENOUS BLD VENIPUNCTURE: CPT

## 2019-01-10 ENCOUNTER — TRANSCRIBE ORDERS (OUTPATIENT)
Dept: ADMINISTRATIVE | Facility: HOSPITAL | Age: 49
End: 2019-01-10

## 2019-01-10 ENCOUNTER — APPOINTMENT (OUTPATIENT)
Dept: LAB | Facility: CLINIC | Age: 49
End: 2019-01-10
Payer: COMMERCIAL

## 2019-01-10 DIAGNOSIS — I10 ESSENTIAL HYPERTENSION, BENIGN: ICD-10-CM

## 2019-01-10 DIAGNOSIS — E55.9 AVITAMINOSIS D: ICD-10-CM

## 2019-01-10 DIAGNOSIS — M15.0 PRIMARY GENERALIZED HYPERTROPHIC OSTEOARTHROSIS: ICD-10-CM

## 2019-01-10 DIAGNOSIS — M15.0 PRIMARY GENERALIZED HYPERTROPHIC OSTEOARTHROSIS: Primary | ICD-10-CM

## 2019-01-10 LAB
25(OH)D3 SERPL-MCNC: 30.6 NG/ML (ref 30–100)
ALBUMIN SERPL BCP-MCNC: 4.2 G/DL (ref 3.5–5)
ALP SERPL-CCNC: 83 U/L (ref 46–116)
ALT SERPL W P-5'-P-CCNC: 66 U/L (ref 12–78)
ANION GAP SERPL CALCULATED.3IONS-SCNC: 7 MMOL/L (ref 4–13)
AST SERPL W P-5'-P-CCNC: 38 U/L (ref 5–45)
BASOPHILS # BLD AUTO: 0.06 THOUSANDS/ΜL (ref 0–0.1)
BASOPHILS NFR BLD AUTO: 1 % (ref 0–1)
BILIRUB SERPL-MCNC: 0.76 MG/DL (ref 0.2–1)
BUN SERPL-MCNC: 13 MG/DL (ref 5–25)
CALCIUM SERPL-MCNC: 9 MG/DL (ref 8.3–10.1)
CHLORIDE SERPL-SCNC: 104 MMOL/L (ref 100–108)
CK MB SERPL-MCNC: 1.6 % (ref 0–2.5)
CK MB SERPL-MCNC: 5.4 NG/ML (ref 0–5)
CK SERPL-CCNC: 339 U/L (ref 39–308)
CO2 SERPL-SCNC: 25 MMOL/L (ref 21–32)
CREAT SERPL-MCNC: 1.04 MG/DL (ref 0.6–1.3)
EOSINOPHIL # BLD AUTO: 0.08 THOUSAND/ΜL (ref 0–0.61)
EOSINOPHIL NFR BLD AUTO: 2 % (ref 0–6)
ERYTHROCYTE [DISTWIDTH] IN BLOOD BY AUTOMATED COUNT: 11.5 % (ref 11.6–15.1)
ERYTHROCYTE [SEDIMENTATION RATE] IN BLOOD: 5 MM/HOUR (ref 0–10)
GFR SERPL CREATININE-BSD FRML MDRD: 83 ML/MIN/1.73SQ M
GLUCOSE SERPL-MCNC: 84 MG/DL (ref 65–140)
HCT VFR BLD AUTO: 43.7 % (ref 36.5–49.3)
HGB BLD-MCNC: 15 G/DL (ref 12–17)
IMM GRANULOCYTES # BLD AUTO: 0.01 THOUSAND/UL (ref 0–0.2)
IMM GRANULOCYTES NFR BLD AUTO: 0 % (ref 0–2)
LYMPHOCYTES # BLD AUTO: 1.84 THOUSANDS/ΜL (ref 0.6–4.47)
LYMPHOCYTES NFR BLD AUTO: 34 % (ref 14–44)
MCH RBC QN AUTO: 29.9 PG (ref 26.8–34.3)
MCHC RBC AUTO-ENTMCNC: 34.3 G/DL (ref 31.4–37.4)
MCV RBC AUTO: 87 FL (ref 82–98)
MONOCYTES # BLD AUTO: 0.54 THOUSAND/ΜL (ref 0.17–1.22)
MONOCYTES NFR BLD AUTO: 10 % (ref 4–12)
NEUTROPHILS # BLD AUTO: 2.82 THOUSANDS/ΜL (ref 1.85–7.62)
NEUTS SEG NFR BLD AUTO: 53 % (ref 43–75)
NRBC BLD AUTO-RTO: 0 /100 WBCS
PLATELET # BLD AUTO: 273 THOUSANDS/UL (ref 149–390)
PMV BLD AUTO: 9.1 FL (ref 8.9–12.7)
POTASSIUM SERPL-SCNC: 3.9 MMOL/L (ref 3.5–5.3)
PROT SERPL-MCNC: 7.5 G/DL (ref 6.4–8.2)
RBC # BLD AUTO: 5.02 MILLION/UL (ref 3.88–5.62)
SODIUM SERPL-SCNC: 136 MMOL/L (ref 136–145)
URATE SERPL-MCNC: 4.7 MG/DL (ref 4.2–8)
WBC # BLD AUTO: 5.35 THOUSAND/UL (ref 4.31–10.16)

## 2019-01-10 PROCEDURE — 82553 CREATINE MB FRACTION: CPT

## 2019-01-10 PROCEDURE — 36415 COLL VENOUS BLD VENIPUNCTURE: CPT

## 2019-01-10 PROCEDURE — 80053 COMPREHEN METABOLIC PANEL: CPT

## 2019-01-10 PROCEDURE — 84550 ASSAY OF BLOOD/URIC ACID: CPT

## 2019-01-10 PROCEDURE — 85025 COMPLETE CBC W/AUTO DIFF WBC: CPT

## 2019-01-10 PROCEDURE — 82550 ASSAY OF CK (CPK): CPT

## 2019-01-10 PROCEDURE — 85652 RBC SED RATE AUTOMATED: CPT

## 2019-01-10 PROCEDURE — 82306 VITAMIN D 25 HYDROXY: CPT
